# Patient Record
Sex: FEMALE | Race: WHITE | NOT HISPANIC OR LATINO | Employment: FULL TIME
[De-identification: names, ages, dates, MRNs, and addresses within clinical notes are randomized per-mention and may not be internally consistent; named-entity substitution may affect disease eponyms.]

---

## 2017-07-01 ENCOUNTER — HEALTH MAINTENANCE LETTER (OUTPATIENT)
Age: 32
End: 2017-07-01

## 2018-07-07 ENCOUNTER — HEALTH MAINTENANCE LETTER (OUTPATIENT)
Age: 33
End: 2018-07-07

## 2019-10-03 ENCOUNTER — HEALTH MAINTENANCE LETTER (OUTPATIENT)
Age: 34
End: 2019-10-03

## 2019-11-11 ENCOUNTER — TRANSFERRED RECORDS (OUTPATIENT)
Dept: PHYSICAL THERAPY | Facility: CLINIC | Age: 34
End: 2019-11-11

## 2020-04-16 LAB
ABORH_EXT (HISTORICAL CONVERSION): NORMAL
ANTIBODY_EXT (HISTORICAL CONVERSION): NORMAL
HBSAG_EXT (HISTORICAL CONVERSION): NORMAL
HGB_EXT (HISTORICAL CONVERSION): 12.7
HIV_EXT: NORMAL
PLT_EXT - HISTORICAL: 203
RPR - HISTORICAL: NORMAL
RUBELLA_EXT (HISTORICAL CONVERSION): NORMAL

## 2020-10-13 LAB — GBS_EXT (HISTORICAL CONVERSION): NEGATIVE

## 2020-10-16 ENCOUNTER — RECORDS - HEALTHEAST (OUTPATIENT)
Dept: ADMINISTRATIVE | Facility: OTHER | Age: 35
End: 2020-10-16

## 2020-10-29 ENCOUNTER — RECORDS - HEALTHEAST (OUTPATIENT)
Dept: ADMINISTRATIVE | Facility: OTHER | Age: 35
End: 2020-10-29

## 2020-10-30 ENCOUNTER — HOSPITAL ENCOUNTER (OUTPATIENT)
Dept: OBGYN | Facility: HOSPITAL | Age: 35
Discharge: HOME | End: 2020-10-30
Attending: OBSTETRICS & GYNECOLOGY | Admitting: OBSTETRICS & GYNECOLOGY

## 2020-10-30 ENCOUNTER — RECORDS - HEALTHEAST (OUTPATIENT)
Dept: ADMINISTRATIVE | Facility: OTHER | Age: 35
End: 2020-10-30

## 2020-10-30 ASSESSMENT — MIFFLIN-ST. JEOR: SCORE: 1201.18

## 2020-11-10 ENCOUNTER — HOSPITAL ENCOUNTER (OUTPATIENT)
Dept: OBGYN | Facility: HOSPITAL | Age: 35
Discharge: HOME | End: 2020-11-10
Attending: OBSTETRICS & GYNECOLOGY | Admitting: OBSTETRICS & GYNECOLOGY

## 2020-11-10 LAB
SARS-COV-2 PCR COMMENT: NORMAL
SARS-COV-2 RNA SPEC QL NAA+PROBE: NEGATIVE
SARS-COV-2 VIRUS SPECIMEN SOURCE: NORMAL

## 2020-11-11 ENCOUNTER — COMMUNICATION - HEALTHEAST (OUTPATIENT)
Dept: SCHEDULING | Facility: CLINIC | Age: 35
End: 2020-11-11

## 2020-11-12 ENCOUNTER — ANESTHESIA - HEALTHEAST (OUTPATIENT)
Dept: OBGYN | Facility: HOSPITAL | Age: 35
End: 2020-11-12

## 2020-11-13 ENCOUNTER — HOME CARE/HOSPICE - HEALTHEAST (OUTPATIENT)
Dept: HOME HEALTH SERVICES | Facility: HOME HEALTH | Age: 35
End: 2020-11-13

## 2021-01-15 ENCOUNTER — HEALTH MAINTENANCE LETTER (OUTPATIENT)
Age: 36
End: 2021-01-15

## 2021-06-05 VITALS — BODY MASS INDEX: 25.52 KG/M2 | HEIGHT: 60 IN | WEIGHT: 130 LBS

## 2021-06-12 NOTE — PROGRESS NOTES
Dr. Anderson called and notified of US results - SDP 2.5 cm and SOLO 5.7, biophysical profile 8/8.  Category 1 FH tracing.  Per Dr. Newell discharge patient home and have her follow-up Monday in clinic.

## 2021-06-12 NOTE — PROGRESS NOTES
Pt arrives to L&D from clinic for BPP with amniotic fluid volume and NST, pt oriented to room 6, EFM applied and triage navigator completed.

## 2021-06-12 NOTE — PROGRESS NOTES
Pt notified Dr. Newell aware of US results and pt okay to discharge home.  Reviewed discharge AVS with pt.  Pt verbalizes understanding to keep follow-up appointment on Monday with Dr. Newell.  Pt denies further questions and discharged home.

## 2021-06-13 NOTE — ANESTHESIA PROCEDURE NOTES
Epidural Block    Patient location during procedure: OB  Time Called: 11/12/2020 12:15 AM  Reason for Block:labor epidural  Staffing:  Performing  Anesthesiologist: Gladys Coon MD  Preanesthetic Checklist  Completed: patient identified, risks, benefits, and alternatives discussed, timeout performed, consent obtained, airway assessed, oxygen available, suction available, emergency drugs available and hand hygiene performed  Procedure  Patient position: sitting  Prep: ChloraPrep  Patient monitoring: continuous pulse oximetry, heart rate and blood pressure  Approach: midline  Location: L3-L4  Injection technique: SOBEIDA saline  Number of Attempts:1  Needle  Needle type: Pb   Needle gauge: 18 G     Catheter in Space: 5  Assessment  Sensory level:  No complications

## 2021-06-13 NOTE — ANESTHESIA PREPROCEDURE EVALUATION
Anesthesia Evaluation      Patient summary reviewed     Airway    Pulmonary - negative ROS                          Cardiovascular - negative ROS   Neuro/Psych    (+) depression,     Endo/Other    (+) hypothyroidism, pregnant     GI/Hepatic/Renal - negative ROS           Dental                         Anesthesia Plan  Planned anesthetic: epidural    ASA 2     Anesthetic plan and risks discussed with: patient and spouse    Post-op plan: routine recovery

## 2021-06-13 NOTE — ANESTHESIA POSTPROCEDURE EVALUATION
Patient: Yessi Kulkarni  * No procedures listed *  Anesthesia type: epidural    Patient location: PACU  Last vitals: No vitals data found for the desired time range.    Post vital signs: stable  Level of consciousness: awake and responds to simple questions  Post-anesthesia pain: pain controlled  Post-anesthesia nausea and vomiting: no  Pulmonary: unassisted, return to baseline  Cardiovascular: stable and blood pressure at baseline  Hydration: adequate  Anesthetic events: no    QCDR Measures:  ASA# 11 - Rody-op Cardiac Arrest: ASA11B - Patient did NOT experience unanticipated cardiac arrest  ASA# 12 - Rody-op Mortality Rate: ASA12B - Patient did NOT die  ASA# 13 - PACU Re-Intubation Rate: ASA13B - Patient did NOT require a new airway mgmt  ASA# 10 - Composite Anes Safety: ASA10A - No serious adverse event    Additional Notes:

## 2021-09-05 ENCOUNTER — HEALTH MAINTENANCE LETTER (OUTPATIENT)
Age: 36
End: 2021-09-05

## 2022-02-20 ENCOUNTER — HEALTH MAINTENANCE LETTER (OUTPATIENT)
Age: 37
End: 2022-02-20

## 2022-10-23 ENCOUNTER — HEALTH MAINTENANCE LETTER (OUTPATIENT)
Age: 37
End: 2022-10-23

## 2022-10-24 LAB
ABO (EXTERNAL): NORMAL
HEMATOCRIT (EXTERNAL): 42.5 % (ref 35–45)
HEMOGLOBIN (EXTERNAL): 14.2 G/DL (ref 11.7–15.5)
HEPATITIS B SURFACE ANTIGEN (EXTERNAL): NONREACTIVE
HEPATITIS C ANTIBODY (EXTERNAL): NEGATIVE
HIV1+2 AB SERPL QL IA: NEGATIVE
RH (EXTERNAL): NEGATIVE
RUBELLA ANTIBODY IGG (EXTERNAL): NORMAL
VDRL (SYPHILIS) (EXTERNAL): NONREACTIVE

## 2022-11-14 ENCOUNTER — TRANSFERRED RECORDS (OUTPATIENT)
Dept: HEALTH INFORMATION MANAGEMENT | Facility: CLINIC | Age: 37
End: 2022-11-14

## 2023-04-02 ENCOUNTER — HEALTH MAINTENANCE LETTER (OUTPATIENT)
Age: 38
End: 2023-04-02

## 2023-05-15 LAB — GROUP B STREPTOCOCCUS (EXTERNAL): NEGATIVE

## 2023-06-08 ENCOUNTER — ANESTHESIA (OUTPATIENT)
Dept: OBGYN | Facility: HOSPITAL | Age: 38
End: 2023-06-08
Payer: COMMERCIAL

## 2023-06-08 ENCOUNTER — ANESTHESIA EVENT (OUTPATIENT)
Dept: OBGYN | Facility: HOSPITAL | Age: 38
End: 2023-06-08
Payer: COMMERCIAL

## 2023-06-08 ENCOUNTER — HOSPITAL ENCOUNTER (INPATIENT)
Facility: HOSPITAL | Age: 38
LOS: 2 days | Discharge: HOME | End: 2023-06-10
Attending: INTERNAL MEDICINE | Admitting: OBSTETRICS & GYNECOLOGY
Payer: COMMERCIAL

## 2023-06-08 PROBLEM — Z37.9 NORMAL LABOR: Status: ACTIVE | Noted: 2023-06-08

## 2023-06-08 PROBLEM — Z36.89 ENCOUNTER FOR TRIAGE IN PREGNANT PATIENT: Status: ACTIVE | Noted: 2023-06-08

## 2023-06-08 LAB
ABO/RH(D): NORMAL
ANTIBODY SCREEN, TUBE: NORMAL
HOLD SPECIMEN: NORMAL
SPECIMEN EXPIRATION DATE: NORMAL
SPECIMEN EXPIRATION DATE: NORMAL

## 2023-06-08 PROCEDURE — 00HU33Z INSERTION OF INFUSION DEVICE INTO SPINAL CANAL, PERCUTANEOUS APPROACH: ICD-10-PCS | Performed by: ANESTHESIOLOGY

## 2023-06-08 PROCEDURE — 86850 RBC ANTIBODY SCREEN: CPT | Performed by: OBSTETRICS & GYNECOLOGY

## 2023-06-08 PROCEDURE — 258N000003 HC RX IP 258 OP 636: Performed by: OBSTETRICS & GYNECOLOGY

## 2023-06-08 PROCEDURE — 86901 BLOOD TYPING SEROLOGIC RH(D): CPT | Performed by: OBSTETRICS & GYNECOLOGY

## 2023-06-08 PROCEDURE — 36415 COLL VENOUS BLD VENIPUNCTURE: CPT | Performed by: OBSTETRICS & GYNECOLOGY

## 2023-06-08 PROCEDURE — 120N000001 HC R&B MED SURG/OB

## 2023-06-08 PROCEDURE — 3E0R3BZ INTRODUCTION OF ANESTHETIC AGENT INTO SPINAL CANAL, PERCUTANEOUS APPROACH: ICD-10-PCS | Performed by: ANESTHESIOLOGY

## 2023-06-08 PROCEDURE — 370N000003 HC ANESTHESIA WARD SERVICE: Performed by: ANESTHESIOLOGY

## 2023-06-08 PROCEDURE — 86780 TREPONEMA PALLIDUM: CPT | Performed by: OBSTETRICS & GYNECOLOGY

## 2023-06-08 RX ORDER — METOCLOPRAMIDE 10 MG/1
10 TABLET ORAL EVERY 6 HOURS PRN
Status: DISCONTINUED | OUTPATIENT
Start: 2023-06-08 | End: 2023-06-09 | Stop reason: HOSPADM

## 2023-06-08 RX ORDER — FENTANYL CITRATE-0.9 % NACL/PF 10 MCG/ML
100 PLASTIC BAG, INJECTION (ML) INTRAVENOUS EVERY 5 MIN PRN
Status: DISCONTINUED | OUTPATIENT
Start: 2023-06-08 | End: 2023-06-09 | Stop reason: HOSPADM

## 2023-06-08 RX ORDER — NALOXONE HYDROCHLORIDE 0.4 MG/ML
0.4 INJECTION, SOLUTION INTRAMUSCULAR; INTRAVENOUS; SUBCUTANEOUS
Status: DISCONTINUED | OUTPATIENT
Start: 2023-06-08 | End: 2023-06-09 | Stop reason: HOSPADM

## 2023-06-08 RX ORDER — KETOROLAC TROMETHAMINE 30 MG/ML
30 INJECTION, SOLUTION INTRAMUSCULAR; INTRAVENOUS
Status: DISCONTINUED | OUTPATIENT
Start: 2023-06-08 | End: 2023-06-10 | Stop reason: HOSPADM

## 2023-06-08 RX ORDER — CITRIC ACID/SODIUM CITRATE 334-500MG
30 SOLUTION, ORAL ORAL
Status: DISCONTINUED | OUTPATIENT
Start: 2023-06-08 | End: 2023-06-09 | Stop reason: HOSPADM

## 2023-06-08 RX ORDER — MISOPROSTOL 200 UG/1
400 TABLET ORAL
Status: DISCONTINUED | OUTPATIENT
Start: 2023-06-08 | End: 2023-06-09 | Stop reason: HOSPADM

## 2023-06-08 RX ORDER — LEVOTHYROXINE SODIUM 50 UG/1
50 TABLET ORAL DAILY
COMMUNITY

## 2023-06-08 RX ORDER — IBUPROFEN 800 MG/1
800 TABLET, FILM COATED ORAL
Status: DISCONTINUED | OUTPATIENT
Start: 2023-06-08 | End: 2023-06-10 | Stop reason: HOSPADM

## 2023-06-08 RX ORDER — ONDANSETRON 2 MG/ML
4 INJECTION INTRAMUSCULAR; INTRAVENOUS EVERY 6 HOURS PRN
Status: DISCONTINUED | OUTPATIENT
Start: 2023-06-08 | End: 2023-06-09 | Stop reason: HOSPADM

## 2023-06-08 RX ORDER — LIDOCAINE 40 MG/G
CREAM TOPICAL
Status: DISCONTINUED | OUTPATIENT
Start: 2023-06-08 | End: 2023-06-08 | Stop reason: HOSPADM

## 2023-06-08 RX ORDER — FENTANYL CITRATE 50 UG/ML
50 INJECTION, SOLUTION INTRAMUSCULAR; INTRAVENOUS EVERY 30 MIN PRN
Status: DISCONTINUED | OUTPATIENT
Start: 2023-06-08 | End: 2023-06-09 | Stop reason: HOSPADM

## 2023-06-08 RX ORDER — CARBOPROST TROMETHAMINE 250 UG/ML
250 INJECTION, SOLUTION INTRAMUSCULAR
Status: DISCONTINUED | OUTPATIENT
Start: 2023-06-08 | End: 2023-06-09 | Stop reason: HOSPADM

## 2023-06-08 RX ORDER — OXYTOCIN/0.9 % SODIUM CHLORIDE 30/500 ML
340 PLASTIC BAG, INJECTION (ML) INTRAVENOUS CONTINUOUS PRN
Status: DISCONTINUED | OUTPATIENT
Start: 2023-06-08 | End: 2023-06-09 | Stop reason: HOSPADM

## 2023-06-08 RX ORDER — FERROUS SULFATE 325(65) MG
325 TABLET ORAL
COMMUNITY

## 2023-06-08 RX ORDER — FENTANYL/ROPIVACAINE/NS/PF 2MCG/ML-.1
PLASTIC BAG, INJECTION (ML) EPIDURAL
Status: DISCONTINUED | OUTPATIENT
Start: 2023-06-09 | End: 2023-06-09 | Stop reason: HOSPADM

## 2023-06-08 RX ORDER — METOCLOPRAMIDE HYDROCHLORIDE 5 MG/ML
10 INJECTION INTRAMUSCULAR; INTRAVENOUS EVERY 6 HOURS PRN
Status: DISCONTINUED | OUTPATIENT
Start: 2023-06-08 | End: 2023-06-09 | Stop reason: HOSPADM

## 2023-06-08 RX ORDER — PROCHLORPERAZINE 25 MG
25 SUPPOSITORY, RECTAL RECTAL EVERY 12 HOURS PRN
Status: DISCONTINUED | OUTPATIENT
Start: 2023-06-08 | End: 2023-06-09 | Stop reason: HOSPADM

## 2023-06-08 RX ORDER — PROCHLORPERAZINE MALEATE 10 MG
10 TABLET ORAL EVERY 6 HOURS PRN
Status: DISCONTINUED | OUTPATIENT
Start: 2023-06-08 | End: 2023-06-09 | Stop reason: HOSPADM

## 2023-06-08 RX ORDER — OXYTOCIN/0.9 % SODIUM CHLORIDE 30/500 ML
100-340 PLASTIC BAG, INJECTION (ML) INTRAVENOUS CONTINUOUS PRN
Status: DISCONTINUED | OUTPATIENT
Start: 2023-06-08 | End: 2023-06-10 | Stop reason: HOSPADM

## 2023-06-08 RX ORDER — ONDANSETRON 4 MG/1
4 TABLET, ORALLY DISINTEGRATING ORAL EVERY 6 HOURS PRN
Status: DISCONTINUED | OUTPATIENT
Start: 2023-06-08 | End: 2023-06-09 | Stop reason: HOSPADM

## 2023-06-08 RX ORDER — NALOXONE HYDROCHLORIDE 0.4 MG/ML
0.2 INJECTION, SOLUTION INTRAMUSCULAR; INTRAVENOUS; SUBCUTANEOUS
Status: DISCONTINUED | OUTPATIENT
Start: 2023-06-08 | End: 2023-06-09 | Stop reason: HOSPADM

## 2023-06-08 RX ORDER — MISOPROSTOL 200 UG/1
800 TABLET ORAL
Status: DISCONTINUED | OUTPATIENT
Start: 2023-06-08 | End: 2023-06-09 | Stop reason: HOSPADM

## 2023-06-08 RX ORDER — OXYTOCIN 10 [USP'U]/ML
10 INJECTION, SOLUTION INTRAMUSCULAR; INTRAVENOUS
Status: DISCONTINUED | OUTPATIENT
Start: 2023-06-08 | End: 2023-06-09 | Stop reason: HOSPADM

## 2023-06-08 RX ORDER — OXYTOCIN 10 [USP'U]/ML
10 INJECTION, SOLUTION INTRAMUSCULAR; INTRAVENOUS
Status: DISCONTINUED | OUTPATIENT
Start: 2023-06-08 | End: 2023-06-10 | Stop reason: HOSPADM

## 2023-06-08 RX ORDER — PRENATAL VIT/IRON FUM/FOLIC AC 27MG-0.8MG
1 TABLET ORAL DAILY
COMMUNITY

## 2023-06-08 RX ORDER — NALBUPHINE HYDROCHLORIDE 20 MG/ML
2.5-5 INJECTION, SOLUTION INTRAMUSCULAR; INTRAVENOUS; SUBCUTANEOUS EVERY 6 HOURS PRN
Status: DISCONTINUED | OUTPATIENT
Start: 2023-06-08 | End: 2023-06-10 | Stop reason: HOSPADM

## 2023-06-08 RX ORDER — METHYLERGONOVINE MALEATE 0.2 MG/ML
200 INJECTION INTRAVENOUS
Status: DISCONTINUED | OUTPATIENT
Start: 2023-06-08 | End: 2023-06-09 | Stop reason: HOSPADM

## 2023-06-08 RX ADMIN — SODIUM CHLORIDE, POTASSIUM CHLORIDE, SODIUM LACTATE AND CALCIUM CHLORIDE 1000 ML: 600; 310; 30; 20 INJECTION, SOLUTION INTRAVENOUS at 23:08

## 2023-06-08 ASSESSMENT — ACTIVITIES OF DAILY LIVING (ADL)
CHANGE_IN_FUNCTIONAL_STATUS_SINCE_ONSET_OF_CURRENT_ILLNESS/INJURY: NO
DIFFICULTY_EATING/SWALLOWING: NO
FALL_HISTORY_WITHIN_LAST_SIX_MONTHS: NO
WALKING_OR_CLIMBING_STAIRS_DIFFICULTY: NO
DOING_ERRANDS_INDEPENDENTLY_DIFFICULTY: NO
WEAR_GLASSES_OR_BLIND: NO
ADLS_ACUITY_SCORE: 18
DRESSING/BATHING_DIFFICULTY: NO
TOILETING_ISSUES: NO
ADLS_ACUITY_SCORE: 35
CONCENTRATING,_REMEMBERING_OR_MAKING_DECISIONS_DIFFICULTY: NO

## 2023-06-09 PROCEDURE — 250N000011 HC RX IP 250 OP 636: Performed by: ANESTHESIOLOGY

## 2023-06-09 PROCEDURE — 722N000001 HC LABOR CARE VAGINAL DELIVERY SINGLE

## 2023-06-09 PROCEDURE — 250N000009 HC RX 250: Performed by: OBSTETRICS & GYNECOLOGY

## 2023-06-09 PROCEDURE — 120N000001 HC R&B MED SURG/OB

## 2023-06-09 PROCEDURE — 258N000003 HC RX IP 258 OP 636: Performed by: OBSTETRICS & GYNECOLOGY

## 2023-06-09 PROCEDURE — 0DQR0ZZ REPAIR ANAL SPHINCTER, OPEN APPROACH: ICD-10-PCS | Performed by: FAMILY MEDICINE

## 2023-06-09 PROCEDURE — 250N000011 HC RX IP 250 OP 636: Performed by: FAMILY MEDICINE

## 2023-06-09 PROCEDURE — 250N000013 HC RX MED GY IP 250 OP 250 PS 637: Performed by: FAMILY MEDICINE

## 2023-06-09 RX ORDER — MODIFIED LANOLIN
OINTMENT (GRAM) TOPICAL
Status: DISCONTINUED | OUTPATIENT
Start: 2023-06-09 | End: 2023-06-10 | Stop reason: HOSPADM

## 2023-06-09 RX ORDER — OXYTOCIN/0.9 % SODIUM CHLORIDE 30/500 ML
340 PLASTIC BAG, INJECTION (ML) INTRAVENOUS CONTINUOUS PRN
Status: DISCONTINUED | OUTPATIENT
Start: 2023-06-09 | End: 2023-06-10 | Stop reason: HOSPADM

## 2023-06-09 RX ORDER — HYDROCORTISONE 25 MG/G
CREAM TOPICAL 3 TIMES DAILY PRN
Status: DISCONTINUED | OUTPATIENT
Start: 2023-06-09 | End: 2023-06-10 | Stop reason: HOSPADM

## 2023-06-09 RX ORDER — SODIUM CHLORIDE, SODIUM LACTATE, POTASSIUM CHLORIDE, CALCIUM CHLORIDE 600; 310; 30; 20 MG/100ML; MG/100ML; MG/100ML; MG/100ML
INJECTION, SOLUTION INTRAVENOUS CONTINUOUS
Status: DISCONTINUED | OUTPATIENT
Start: 2023-06-09 | End: 2023-06-10 | Stop reason: HOSPADM

## 2023-06-09 RX ORDER — DOCUSATE SODIUM 100 MG/1
100 CAPSULE, LIQUID FILLED ORAL DAILY
Status: DISCONTINUED | OUTPATIENT
Start: 2023-06-09 | End: 2023-06-10 | Stop reason: HOSPADM

## 2023-06-09 RX ORDER — BUPIVACAINE HYDROCHLORIDE 2.5 MG/ML
INJECTION, SOLUTION EPIDURAL; INFILTRATION; INTRACAUDAL
Status: COMPLETED | OUTPATIENT
Start: 2023-06-09 | End: 2023-06-09

## 2023-06-09 RX ORDER — MISOPROSTOL 200 UG/1
400 TABLET ORAL
Status: DISCONTINUED | OUTPATIENT
Start: 2023-06-09 | End: 2023-06-10 | Stop reason: HOSPADM

## 2023-06-09 RX ORDER — IBUPROFEN 800 MG/1
800 TABLET, FILM COATED ORAL EVERY 6 HOURS PRN
Status: DISCONTINUED | OUTPATIENT
Start: 2023-06-09 | End: 2023-06-10 | Stop reason: HOSPADM

## 2023-06-09 RX ORDER — ACETAMINOPHEN 325 MG/1
650 TABLET ORAL EVERY 4 HOURS PRN
Status: DISCONTINUED | OUTPATIENT
Start: 2023-06-09 | End: 2023-06-10 | Stop reason: HOSPADM

## 2023-06-09 RX ORDER — BISACODYL 10 MG
10 SUPPOSITORY, RECTAL RECTAL DAILY PRN
Status: DISCONTINUED | OUTPATIENT
Start: 2023-06-09 | End: 2023-06-10 | Stop reason: HOSPADM

## 2023-06-09 RX ORDER — MISOPROSTOL 200 UG/1
800 TABLET ORAL
Status: DISCONTINUED | OUTPATIENT
Start: 2023-06-09 | End: 2023-06-10 | Stop reason: HOSPADM

## 2023-06-09 RX ORDER — OXYTOCIN 10 [USP'U]/ML
10 INJECTION, SOLUTION INTRAMUSCULAR; INTRAVENOUS
Status: DISCONTINUED | OUTPATIENT
Start: 2023-06-09 | End: 2023-06-10 | Stop reason: HOSPADM

## 2023-06-09 RX ORDER — CARBOPROST TROMETHAMINE 250 UG/ML
250 INJECTION, SOLUTION INTRAMUSCULAR
Status: DISCONTINUED | OUTPATIENT
Start: 2023-06-09 | End: 2023-06-10 | Stop reason: HOSPADM

## 2023-06-09 RX ORDER — METHYLERGONOVINE MALEATE 0.2 MG/ML
200 INJECTION INTRAVENOUS
Status: DISCONTINUED | OUTPATIENT
Start: 2023-06-09 | End: 2023-06-10 | Stop reason: HOSPADM

## 2023-06-09 RX ADMIN — IBUPROFEN 800 MG: 800 TABLET ORAL at 17:32

## 2023-06-09 RX ADMIN — Medication: at 21:50

## 2023-06-09 RX ADMIN — HUMAN RHO(D) IMMUNE GLOBULIN 300 MCG: 1500 SOLUTION INTRAMUSCULAR; INTRAVENOUS at 10:19

## 2023-06-09 RX ADMIN — SODIUM CHLORIDE, POTASSIUM CHLORIDE, SODIUM LACTATE AND CALCIUM CHLORIDE 125 ML/HR: 600; 310; 30; 20 INJECTION, SOLUTION INTRAVENOUS at 01:00

## 2023-06-09 RX ADMIN — BUPIVACAINE HYDROCHLORIDE 5 ML: 2.5 INJECTION, SOLUTION EPIDURAL; INFILTRATION; INTRACAUDAL at 00:15

## 2023-06-09 RX ADMIN — DOCUSATE SODIUM 100 MG: 100 CAPSULE, LIQUID FILLED ORAL at 10:18

## 2023-06-09 RX ADMIN — BENZOCAINE AND LEVOMENTHOL: 200; 5 SPRAY TOPICAL at 13:23

## 2023-06-09 RX ADMIN — ACETAMINOPHEN 650 MG: 325 TABLET ORAL at 21:51

## 2023-06-09 RX ADMIN — WITCH HAZEL: 500 SOLUTION RECTAL; TOPICAL at 13:23

## 2023-06-09 RX ADMIN — Medication: at 00:20

## 2023-06-09 RX ADMIN — LIDOCAINE HYDROCHLORIDE 20 ML: 10 INJECTION, SOLUTION EPIDURAL; INFILTRATION; INTRACAUDAL; PERINEURAL at 06:45

## 2023-06-09 ASSESSMENT — ACTIVITIES OF DAILY LIVING (ADL)
ADLS_ACUITY_SCORE: 18
ADLS_ACUITY_SCORE: 18
ADLS_ACUITY_SCORE: 21
ADLS_ACUITY_SCORE: 18

## 2023-06-09 NOTE — PROGRESS NOTES
Reactive NST. fhts 140 with moderate variaiblty, 15x15/prolonged accels present, no decels.   cxns 3-7 min apart. Mild in palpation thus far.   elevated bed with pt in it. Discussed safety and protocol of bed in lowest position when pt in it and staff not bedside. Cautioned on falling.   Birthing ball brought in to room. Pt enc to use tub as well. Discussed frequency for Intermittent Monitoring.   Also requested pt left RN know when she was willing to have admission questions asked. Pt verbalized understanding and agreement.Aaliyah Holt, RN

## 2023-06-09 NOTE — LACTATION NOTE
"This note was copied from a baby's chart.  This writer met with Yessi per lactation order.    Low milk supply with her first child who had torticollis, tongue tie and lip tie.  Yessi brought that infant to cranial sacral therapy and also had the lingual frenulum released.  She reports breastfeeding her first child for 30 minutes, then would bottle feed infant and then pump for one hour.  She states trying every flange size there is and found the 13 mm flange was the best fit (even though it took 60 minutes to drain her breasts).  She saw several lactation consultants and found that infant could only transfer 15 ml at the breast, then she could pump 2 oz with the pump (over an hour pumping session).  She states she has discussed with her  that she does not want to feed this  the same way she fed her first infant.      This writer assessed Yessi's nipples and recomends she uses the 21 mm flange, which this writer provided from the lactation office.  Educated that \"empty breasts make milk\".  If infant unable to drain the breast, then the pump needs to, and the breasts should be drained within 15-20 minutes.  If it takes longer to drain, then the flange may be the wrong size or the suction is too high.  Education given on the maintain program of breast pumps.  No milk flowing=Pump on fast cycle.  Milk is flowing=Pump on slow cycle.  Change the cycle (speed of the suck) according to milk flow and pump until breasts are drained, which is typically 15-20 minutes.  Suction is increased for comfort.  No pain with pumping.      Yessi has moderate nipple discomfort.  She states infant unable to obtain a deep latch.  Suck assessment done.  Infant unable to open his mouth and only the tip of this writer's gloved finger can enter infant's mouth.  Gentle jaw massage done for 1-2 minutes with slight improvement.  Hyper gag reflex noted.  Instructed Yessi to do gentle jaw massage for 1-2 minutes prior to breastfeeding, then " "allow infant to suck on finger.  Hand express breast to get colostrum flowing.  (Yessi was educated on hand expression and obtained a few drops, which were finger fed to infant).  Yessi request this writer educate her on latching infant at the breast.  Educated on properly positioning infant in cross cradle hold and shaping her breast tissue to match infant's mouth.  Asymmetrical latch technique taught and latch visual discussed.  She was able to properly hold infant at the breast and had good technique latching infant, however, she immediately complained of \"pinching\" on her nipples and needed to break infant's suction.  20 mm nipple shield given and educated on use and cleaning.  Able to latch infant onto the breast, however, Yessi had too much nipple pain and released suction.      Breastfeeding plan:    Yessi desires to pump and bottle feed only, at this time.  She desires donor milk.  Bedside RN updated.  She pumped her breasts with the 21 mm flange.  We discussed the care plan below.  IF infant does latch, she is to have comfort and listen for infant's swallows.  If infant has a supplement, then Yessi to pump.  Yessi is eager to learn.  Lactation to follow up tomorrow to assess breastfeeding plan.  Yessi verbalizes understanding of all education given.  She denies any further questions.          Care Plan - Latch Difficulty       Place baby skin to skin on mom's chest up to an hour before feeding.     Attempt breastfeeding on infant's early hunger cues (hand in mouth, rooting).    Position baby in cross cradle or football hold, which may help achieve latch.     If latched, watch for rhythmic sucking and occasional swallows.    Limit latch attempts to 5-10 minutes.    If latch difficulty or few/no swallows noted:  o Hand express colostrum and offer via spoon before or after feeding attempt to increase baby's energy level.  o Pump breasts for 15 minutes to stimulate milk production.   o Feed expressed milk to baby " using the amounts below as a guideline, give more as baby cues.  If necessary, make up the difference with donor milk or formula as a bridge until milk supply increases:    0-24 hours     2-10 ml each feeding (may use spoon)  24-48 hours   5-15 ml each feeding  48-72 hours   15-30 ml each feeding  72-96 hours   30-60 ml each feeding     Contact Outpatient Lactation Clinic after discharge as needed. [phone]            12/2021    Care Plan for Nipple Shield Use    How to use:    Wash in warm, soapy water. May leave moist to help stick to the skin.    Invert the nipple shield   way inside out and place over nipple.     the wings and stretch the nipple shield, easing the shield right side out and onto the nipple.    The shield should fit comfortably without pinching.    Latch baby deeply. Baby's lips should reach the flat base with entire nipple in baby's mouth.    Watch for:    Rhythmic sucking and swallowing.    Content baby after feeding.    Adequate wet & dirty diapers (per feeding log).    If baby not meeting above goals, pump breasts for 15 minutes to stimulate milk supply.    Weaning from a Nipple Shield:    Some babies need the nipple shield for a few days or a few weeks.  Once feeding improves, remove the nipple shield after a few minutes of breastfeeding and try to latch baby without the nipple shield.      Follow up with Outpatient Lactation Clinic is recommended - [phone].    12/2021

## 2023-06-09 NOTE — ANESTHESIA POSTPROCEDURE EVALUATION
Patient: Yessi Kulkarni    Procedure: * No procedures listed *       Anesthesia Type:  Epidural    Note:  Disposition: Admission   Postop Pain Control: Uneventful            Sign Out: Well controlled pain   PONV: No   Neuro/Psych: Uneventful            Sign Out: Acceptable/Baseline neuro status   Airway/Respiratory: Uneventful            Sign Out: Acceptable/Baseline resp. status   CV/Hemodynamics: Uneventful            Sign Out: Acceptable CV status; No obvious hypovolemia; No obvious fluid overload   Other NRE: NONE   DID A NON-ROUTINE EVENT OCCUR? No    Event details/Postop Comments:  Patient denies headache, point back tenderness, and issues with urination/ambulation.              Last vitals:  Vitals:    06/09/23 0818 06/09/23 0823 06/09/23 0828   BP:   116/68   Pulse:      Resp:      Temp:      SpO2: 97% 97% 98%       Electronically Signed By: JOSE ARORA MD  June 9, 2023  11:50 AM
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
## 2023-06-09 NOTE — PLAN OF CARE
Problem: Labor  Goal: Stable Fetal Wellbeing  Outcome: Met   Goal Outcome Evaluation:       Delivery of stable .  Apgars 8/9  Problem: Labor  Goal: Absence of Infection Signs and Symptoms  Outcome: Met     Afebrile and stable vital signs throughout labor

## 2023-06-09 NOTE — PLAN OF CARE
Postpartum assessment WNL. Denies significant pain. Declines pain medication.     Up independently, voiding without difficulty.    Perineum slightly uncomfortable, sitz bath taken. Witch hazel pads, derm spray and ice pack pad in use.     Breastfeeding, reports slight pain of nipples. Lactation consult placed. Offered nipple shields, patient declined. Educated on shallow vs deep latch.     PPMA and birth certificate need to be completed- at bedside. Patient desires to go home tomorrow. Attentive to and loving towards infant.

## 2023-06-09 NOTE — ANESTHESIA PROCEDURE NOTES
"Epidural catheter Procedure Note    Pre-Procedure   Staff -        Anesthesiologist:  Teresa Valdovinos MD       Performed By: anesthesiologist       Location: OB       Procedure Start/Stop Times: 6/9/2023 11:59 AM and 6/9/2023 12:21 PM       Pre-Anesthestic Checklist: patient identified, IV checked, risks and benefits discussed, informed consent, monitors and equipment checked, pre-op evaluation, at physician/surgeon's request and post-op pain management  Timeout:       Correct Patient: Yes        Correct Procedure: Yes        Correct Site: Yes        Correct Position: Yes   Procedure Documentation  Procedure: epidural catheter       Patient Position: sitting       Patient Prep/Sterile Barriers: sterile gloves, mask, patient draped       Skin prep: Chloraprep       Local skin infiltrated with mL of 2% lidocaine.        Insertion Site: L3-4. (midline approach).       Technique: LORT saline        SOBEIDA at 4 cm.       Needle Type: ToListen Upy needle       Needle Gauge: 17.        Needle Length (Inches): 3.5        Catheter: 18 G.          Catheter threaded easily.         5 cm epidural space.         Threaded 9 cm at skin.         # of attempts: 1 and  # of redirects:  0    Assessment/Narrative         Paresthesias: No.       Test dose of 3 mL lidocaine 1.5% w/ 1:200,000 epinephrine at 00:10 CDT.         Test dose negative, 3 minutes after injection, for signs of intravascular, subdural, or intrathecal injection.       Insertion/Infusion Method: LORT saline       No aspiration negative for Heme or CSF via Epidural Catheter.       Sensory Level Left: T10.       Sensory Level Right: T10.    Medication(s) Administered   0.25% Bupivacaine PF (Epidural) - EPIDURAL   5 mL - 6/9/2023 12:15:00 AM  Medication Administration Time: 6/9/2023 11:59 AM      FOR [facility] ([facility] ONLY:   Pain Team Contact information: please page the Pain Team Via [employer]. Search \"Pain\". During daytime hours, please page the attending first. At " night please page the resident first.
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
## 2023-06-09 NOTE — PROGRESS NOTES
Pt called RN into room at 2238 to state she was feeling more pressure. Pt standing with , leaning into him. Offered sve and pt declined. Did IA with pt standing bedside. RN remained bedside with pt, waiting for direction on her needs. Pt still using hypnobirthing via head band, leaning into  and  giving counter pressure. Pt stated she feels baby coming down. Discussed sve again and this time in agreement. assisted pt to lying position to the best she was able for sve. Cervix was 4/80/-2. BOW intact. Bloody show present.   Pt stated she wanted the epidural. #20 gauge IV to left hand placed on 2nd attempt. Blood return noted. Saline flushed. LR adminsiitered at 999ml/hr in prep for the epidural.  Report to LINO Resendiz and care relinquished.Aaliyah Holt RN

## 2023-06-09 NOTE — PROGRESS NOTES
39 y/o at 39.4 weeks gest with edc 23 presents to [facility] via w/c for labor evaluation. Pt here with  and . States cxns started at 1600. +FM. Denies LOF and VB. States cxns are really close and feels them everywhere. Denies complications with pregnancy. Reports GBS neg.  Pt using Hypnobirthing. Remains in own clothes. Declines sve. 2 cxns palpated and mild. Uterus soft between cxns. Pt states baby is vertex as of US on Monday. Only sve was at 36 weeks and was 2cm.   requesting tub upon arrival. Informed of need for monitoring upon arrival to hospital for initial assessment and will then utilize comfort measures that appeal to pt.   Monitors applied and VS taken in semi fowlers position. Offered position change for comfort, pt declined.   Notified Dr. Ireland (covering for [place]) of pt arrival and limited information. No prenatal available. MD requested to monitor pt for labor and check cervix. Obtained orders to remove monitors after Reactive NST/negative OCT.   Pt accepting of position change when RN reviewed tracing and no accels present. Discussed having pt off her back for baby. Positioned to left lateral, pillows between knees and warm back to lower back.Aaliyah Holt RN

## 2023-06-09 NOTE — L&D DELIVERY NOTE
OB Vaginal Delivery Note    Yessi Kulkarni MRN# [id number]   Age: 38 year old YOB: 1985       GA: 39w5d  GP:   Labor Complications: None   EBL:   mL  Delivery QBL: 425 mL  Delivery Type: Vaginal, Spontaneous   ROM to Delivery Time: (Delivered) Hours: 5 Minutes: 25   Weight: 2.977 kg (6 lb 9 oz)    1 Minute 5 Minute 10 Minute   Apgar Totals: 8   9        TALON SUAZO;CL MEAD     Delivery Details:  Yessi Kulkarni, a 38 year old  female delivered a viable infant with apgars of 8  and 9 . Patient was fully dilated and pushing after   hours   minutes in active labor. Delivery was via vaginal, spontaneous  to a sterile field under epidural;local  anesthesia. Infant delivered in vertex  left  occiput  anterior  position. Anterior and posterior shoulders delivered without difficulty. The cord was clamped, cut twice and 3 vessels  were noted. Cord blood was obtained in routine fashion with the following disposition: lab .      Cord complications: nuchal   Placenta delivered at 2023  6:42 AM . Placental disposition was Hospital disposal . Fundal massage performed and fundus found to be firm.     Episiotomy: none    Perineum, vagina, cervix were inspected, and the following lacerations were noted:   Perineal lacerations: 2nd                Any lacerations were repaired in the usual fashion using 3-0 Vicryl.    Excellent hemostasis was noted. Needle count correct. Infant and patient in delivery room in good and stable condition.        Valentín Kulkarni-Yessi [[id number]]    Labor Event Times    Latent labor onset date/time: 2023 1600    Active labor onset date: 23 Onset time:  1:15 AM CDT   Dilation complete date: 23 Complete time:  3:15 AM   Start pushing date/time: 2023 0405      Labor Events     labor?: No   steroids: None  Labor Type: Spontaneous     Antibiotics received during labor?: No     Rupture identifier: Sac 1  Rupture date/time: 23  0055   Rupture type: Spontaneous Rupture of Membranes  Fluid color: Meconium     Augmentation: None     Delivery/Placenta Date and Time    Delivery Date: 23 Delivery Time:  6:20 AM   Placenta Date/Time: 2023  6:42 AM  Delivering clinician: Kathrin Das MD   Other personnel present at delivery:  Provider Role   Astrid Oreilly, RN Registered Nurse   Flavia Fabian RN Registered Nurse         Vaginal Counts     Initial count performed by 2 team members:  Two Team Members   Luzmaria Cheek RN       Needles Suture Needles Sponges (RETIRED) Instruments   Initial counts 0 0 5    Added to count 1 1     Relief counts       Final counts 1 1 5          Placed during labor Accounted for at the end of labor   FSE No NA   IUPC No NA   Cervidil No NA                     Apgars    Living status: Living   1 Minute 5 Minute 10 Minute 15 Minute 20 Minute   Skin color: 1  1       Heart rate: 2  2       Reflex irritability: 1  2       Muscle tone: 2  2       Respiratory effort: 2  2       Total: 8  9       Apgars assigned by: FRANKLIN VICTOR APRN,CNP     Cord    Vessels: 3 Vessels    Cord Complications: Nuchal   Nuchal Intervention: reduced   Nuchal cord description: loose nuchal cord   Cord Blood Disposition: Lab      Gases Sent?: No      Delayed cord clamping?: Yes      Cord Clamping Delay (seconds):  seconds      Stem cell collection?: No                      Resuscitation    Methods: Temp Skin Control, Temp Skin Probe   Care at Delivery: Asked by Dr. Alfonso to attend the delivery of this term, male infant  secondary to meconium fluid.      Infant had spontaneous respirations at birth. He received approximately 2 minutes of delayed cord clamping. He was placed on a warmer, dried, stimulated, and deep suctioned. Suctioned for moderate amount of thick green/brown fluid. Lung sounds coarse, skin pink, loud lusty cry, active tone. Coarse lung sounds improved with suction and time.    Apgars  "were 8 at one minute and 9 at five minutes of age. Gross PE is WNL except for head molding and improving lung coarsness. Infant remains in the care of the L and D staff. Call with concerns.     Lillian QUICK, CNP 2023 6:34 AM     Output in Delivery Room: Stool     [place] Measurements    Weight: 6 lb 9 oz Length: 1' 8\"   Head circumference: 35.5 cm    Output in delivery room: Stool     Labor Events and Shoulder Dystocia    Fetal Tracing Prior to Delivery: Category 2  Shoulder dystocia present?: Neg     Delivery (Maternal) (Provider to Complete) (660754)    Episiotomy: None  Perineal lacerations: 2nd Repaired?: Yes   Repair suture: 3-0 Vicryl  Number of repair packets: 1  Genital tract inspection done: Pos     Blood Loss  Mother: Yessi Kulkarni #[id number]   Start of Mother's Information    Delivery Blood Loss  23 0115 - 23 0727    Delivery QBL (mL) Hospital Encounter 425 mL    Total  425 mL         End of Mother's Information  Mother: Yessi Kulkarni #[id number]          Delivery - Provider to Complete ([id number])    Delivering clinician: Kathrin Das MD  Delivery Type (Choose the 1 that will go to the Birth History): Vaginal, Spontaneous    Other personnel:  Provider Role   Astrid Oreilly, RN Registered Nurse   Flavia Fabian, RN Registered Nurse                               Placenta    Date/Time: 2023  6:42 AM  Removal: Spontaneous  Comments: 2 loose loops of nuchal cord   Disposition: Hospital disposal           Anesthesia    Method: Epidural, Local                Presentation and Position    Presentation: Vertex    Position: Left Occiput Anterior                 Kathrin Das MD  "

## 2023-06-09 NOTE — PROGRESS NOTES
Dr. Ireland in room to see pt. Pt remains in shower. States cxns stronger and closer. No bloody show present. Will cont to support early labor.Aaliyah Holt RN

## 2023-06-09 NOTE — PROGRESS NOTES
While using Intermittent Auscultation, fetal heart tones will be monitored before, during and after cxn. If deceleration noted, will return to continuous monitoring..Aaliyah Holt RN

## 2023-06-10 VITALS
DIASTOLIC BLOOD PRESSURE: 76 MMHG | BODY MASS INDEX: 25.78 KG/M2 | HEIGHT: 60 IN | RESPIRATION RATE: 16 BRPM | HEART RATE: 98 BPM | SYSTOLIC BLOOD PRESSURE: 109 MMHG | OXYGEN SATURATION: 100 % | TEMPERATURE: 97.2 F

## 2023-06-10 LAB — T PALLIDUM AB SER QL: NONREACTIVE

## 2023-06-10 PROCEDURE — 250N000013 HC RX MED GY IP 250 OP 250 PS 637: Performed by: FAMILY MEDICINE

## 2023-06-10 RX ADMIN — DOCUSATE SODIUM 100 MG: 100 CAPSULE, LIQUID FILLED ORAL at 08:19

## 2023-06-10 RX ADMIN — IBUPROFEN 800 MG: 800 TABLET ORAL at 08:19

## 2023-06-10 RX ADMIN — ACETAMINOPHEN 650 MG: 325 TABLET ORAL at 08:20

## 2023-06-10 RX ADMIN — ACETAMINOPHEN 650 MG: 325 TABLET ORAL at 02:12

## 2023-06-10 RX ADMIN — IBUPROFEN 800 MG: 800 TABLET ORAL at 00:47

## 2023-06-10 ASSESSMENT — ACTIVITIES OF DAILY LIVING (ADL)
ADLS_ACUITY_SCORE: 18

## 2023-06-10 NOTE — PLAN OF CARE
Problem: Plan of Care - These are the overarching goals to be used throughout the patient stay.    Goal: Optimal Comfort and Wellbeing  Outcome: Progressing  Intervention: Monitor Pain and Promote Comfort  Recent Flowsheet Documentation  Taken 6/9/2023 2151 by Karen Duque RN  Pain Management Interventions: medication (see MAR)  Taken 6/9/2023 1732 by Karen Duque RN  Pain Management Interventions: medication (see MAR)  Intervention: Provide Person-Centered Care  Recent Flowsheet Documentation  Taken 6/9/2023 1732 by Karen Duque RN  Trust Relationship/Rapport:   care explained   choices provided   questions answered   questions encouraged   thoughts/feelings acknowledged   reassurance provided  Patient independent in room. Did not want to breastfeed due to nipple tenderness. Pumping encouragement and assistance provided. Gel pads and lanolin provided. Tylenol and Ibuprofen given per MAR. Fundus firm, bleeding scant. VSS.    /76 (BP Location: Right arm, Patient Position: Semi-Blevins's, Cuff Size: Adult Regular)   Pulse 88   Temp 97.7  F (36.5  C) (Oral)   Resp 16   Ht 1.524 m (5')   SpO2 97%   BMI 25.78 kg/m      Karen Duque RN on 6/9/2023 at 10:43 PM

## 2023-06-10 NOTE — PLAN OF CARE
Yessi's vitals and maternal assessments WDL. Pain adequately managed with Tylenol and Ibuprofen. Up independently caring for self and infant. Did not breastfeed overnight due to nipple tenderness. Pumping encouraged and assistance offered. Yessi and her spouse are attentive to infant needs, asking appropriate questions and appear to be bonding well.     Problem: Plan of Care - These are the overarching goals to be used throughout the patient stay.    Goal: Optimal Comfort and Wellbeing  Outcome: Progressing  Intervention: Monitor Pain and Promote Comfort  Recent Flowsheet Documentation  Taken 6/10/2023 0208 by Karina Newell, RN  Pain Management Interventions: medication (see MAR)  Intervention: Provide Person-Centered Care  Recent Flowsheet Documentation  Taken 6/10/2023 0208 by Karina Newell, RN  Trust Relationship/Rapport:    care explained    choices provided    emotional support provided    empathic listening provided    questions encouraged    questions answered    thoughts/feelings acknowledged    reassurance provided     Problem: Postpartum (Vaginal Delivery)  Goal: Successful Maternal Role Transition  Outcome: Progressing     Problem: Postpartum (Vaginal Delivery)  Goal: Optimal Pain Control and Function  Outcome: Progressing  Intervention: Prevent or Manage Pain  Recent Flowsheet Documentation  Taken 6/10/2023 0208 by Karina Newell, RN  Pain Management Interventions: medication (see MAR)  Perineal Care:    perineal spray bottle/warm water use encouraged    absorbent brief/pad changed   Goal Outcome Evaluation:      Plan of Care Reviewed With: patient    Overall Patient Progress: improvingOverall Patient Progress: improving

## 2023-06-10 NOTE — PROGRESS NOTES
06/10/23   Yessi Kulkarni   1985    Postpartum Rounding Note    Subjective: Patient doing well. She is tolerating general diet and ambulating without lightheadedness. Vaginal bleeding is within normal limits. She felt incomplete bladder emptying, but was able to empty to less than 300cc on repeat attempt.      Vital signs:  Temp: 97.2  F (36.2  C) Temp src: Oral BP: 109/76 Pulse: 98   Resp: 16 SpO2: 100 % O2 Device: None (Room air)        Physical Exam:  General:  no distress  Abdomen: nontender, uterine fundus firm below umbilicus  Extremities: no calf pain, no edema in legs    Labs: all recent labs reviewed    Assessment/Plan: 38 year old PPD#1 s/p   1. Postpartum    - afebrile, VSS   - continue postpartum cares   2. Rh Neg, baby Rh +   - received rhogam   3. Partial 3rd degree laceration   - bowel regimen discussed  4. Hypothyroidism   - continue PTA levothyroxine at discharge    Discussed plan of care with patient and , and patient expressed understanding. Opportunity for questions given, and all questions were answered.    Disposition: plan for discharge today if bladder scan after next void is <300cc, follow up in office in 2-3 weeks for laceration check and 6 weeks.      Cheli Boudreaux MD

## 2023-06-10 NOTE — DISCHARGE SUMMARY
06/10/23   Yessi JACKSON Shad   1985    Discharge summary    Admit date: 2023     Discharge date: 06/10/23       Admit diagnoses:  1. Labor   2. IUP at 39 weeks 4 days  3. Hypothyroidism  4. Rh Neg    Discharge diagnoses:  1. S/p   2. Partial 3rd degree laceration  3. Hypothyroidism  4. Rh Neg    Hospital course:   The patient was admitted at 39 weeks and 4 days for labor. The patient had a male infant by  at 39 weeks and 5 days complicated by partial 3rd degree laceration; see delivery note.  After , she was tolerating a normal diet and ambulating without lightheadedness, and her vaginal bleeding was within normal limits for postpartum. She felt incomplete bladder emptying, but was able to empty to less than 300cc on bladder scan on repeat attempt. She was discharged to home on postpartum day 1.    Rubella immune  Rh Neg, baby Rh +   - received rhogam     Disposition: Discharged to home with follow-up  in office in 2-3 weeks for laceration check and 6 weeks.      Cheli Boudreaux MD

## 2023-06-10 NOTE — DISCHARGE INSTRUCTIONS
Congratulations on the birth of your baby !    Please call if your bleeding is bright red more than a pad an hour and doesn't seem to be slowing down. Please call if your temperature goes above 100 degrees. If you have stitches, soaking in a warm, soapy bathtub once or twice a day the first week you are home will keep the area clean and help it heal.     You may use Tylenol 1000mg every 8 hours and/or Ibuprofen 800mg every 8 hours for pain.     Recommend taking prenatal vitamin, vitamin D 2000 units/day, and fish oil with DHA while you are breast-feeding. Assure you are eating adequate amounts of protein (about 75g per day if breastfeeding without supplementation during the first 6 months of your baby's life) as well as healthy fats (avocado, olive oil, avocado oil, coconut oil) while breast-feeding. Drink at least 3 quarts of water per day spread out throughout the day.       Call [phone] for questions during the day and for emergencies on nights and weekends.    Please make an appointment in 2 weeks for laceration check and six weeks for postpartum visit.     Postpartum Vaginal Delivery Instructions    Activity     Ask family and friends for help when you need it.  Do not place anything in your vagina for 6 weeks.  You are not restricted on other activities, but take it easy for a few weeks to allow your body to recover from delivery.  You are able to do any activities you feel up to that point.  No driving until you have stopped taking your pain medications (usually two weeks after delivery).     Call your health care provider if you have any of these symptoms:     Increased pain, swelling, redness, or fluid around your stiches from an episiotomy or perineal tear.  A fever above 100.4 F (38 C) with or without chills when placing a thermometer under your tongue.  You soak a sanitary pad with blood within 1 hour, or you see blood clots larger than a golf ball.  Bleeding that lasts more than 6 weeks.  Vaginal  discharge that smells bad.  Severe pain, cramping or tenderness in your lower belly area.  A need to urinate more frequently (use the toilet more often), more urgently (use the toilet very quickly), or it burns when you urinate.  Nausea and vomiting.  Redness, swelling or pain around a vein in your leg.  Problems breastfeeding or a red or painful area on your breast.  Chest pain and cough or are gasping for air.  Problems coping with sadness, anxiety, or depression.  If you have any concerns about hurting yourself or the baby, call your provider immediately.   You have questions or concerns after you return home.     Keep your hands clean:  Always wash your hands before touching your perineal area and stitches.  This helps reduce your risk of infection.  If your hands aren't dirty, you may use an alcohol hand-rub to clean your hands. Keep your nails clean and short.        Warning Signs after Having a Baby (Postpartum)  Keep this paper on your fridge or somewhere else where you can see it.  Baby's birth date ___________________ My provider/hospital ___________________________________  The symptoms below can happen to anyone after giving birth. They can be very serious. Call your provider if you have any of these warning signs.   Call 911 if you:  Are thinking about hurting yourself or your baby  Have any signs below in bold and with a star*  Bleeding  Losing too much blood is called a hemorrhage. These are signs of hemorrhage:  Soaking through a pad in less than an hour  Passing blood clots bigger than a golf ball  Mood problems (postpartum depression)  Many women feel sad after having a baby. But for others, mood swings are worse. Call your provider right away if you are:  Feeling so anxious or nervous that you can't care for yourself or your baby  Thinking about hurting yourself or your baby*  High blood pressure (pre-eclampsia)  Even if you didn't have high blood pressure when you were pregnant, you are still at  "risk for the high blood pressure disease called pre-eclampsia. Your risk can last up to 12 weeks after giving birth.  These are signs of pre-eclampsia:  Severe headache that does not get better after taking medicine*  Seeing spots, flashes of lights, blurry vision or other changes to eyesight*  Pain on your right side under your rib cage  Sudden swelling in the hands and face  Fainting, shaking or other signs of a seizure*  General feeling of \"not feeling right\"*  Infection  Redness around your incision (if you had surgery)  Really bad pain in your bottom if you had stitches  Any yellow, white or green fluid coming from places where you had stitches or surgery  Fever of 100.4 F (38 C) or higher  Blood clots  After you give birth, your body naturally clumps, or clots, its blood to help prevent blood loss (hemorrhage). Sometimes, this can lead to dangerous blood clots. Here are signs that you may have a blood clot:  Pain in your chest*  Hard to breathe*  A red or swollen spot on your leg that is warm or painful when you touch it  Remember:You know your body. If something doesn't feel right, call your provider.  For informational purposes only. Not to replace the advice of your health care provider. Copyright   2020 [employer]. All rights reserved. Clinically reviewed by MARCELLE Jones-OB, MSN. [employer] 586682 - 1/20.  For informational purposes only. Not to replace the advice of your health care provider. Copyright   2020 [employer]. All rights reserved. Clinically reviewed by RIK JonesOB, MSN. [employer] [id number] - 1/20.        "

## 2023-06-10 NOTE — LACTATION NOTE
Lactation to patient room to assess breastfeeding situation at this time. Mom with  history of low milk supply and history of hormonal infertility, pregnancy loss, and low thyroid. Mom states her nipples are very painful so she has been not been breastfeeding or pumping, only hand expression but has not seen colostrum.      Mom states OT has been to evaluate infant suck and bottle feeding ability best with rakan bottle at this time. Encouraged mom to pump on lowest setting comfortable 8x in 24 hours as possible to stimulate milk production during this hormonally sensitive time. Mother very tearful about past breastfeeding experience as well as this experience. Provided active listening, support, and encouragement.    Encouraged mom to follow up with outpatient lactation consultant early next week, as well as endocrinologist for low thyroid. Mom states understanding.

## 2023-06-10 NOTE — PLAN OF CARE
Problem: Plan of Care - These are the overarching goals to be used throughout the patient stay.    Goal: Plan of Care Review  Description: The Plan of Care Review/Shift note should be completed every shift.  The Outcome Evaluation is a brief statement about your assessment that the patient is improving, declining, or no change.  This information will be displayed automatically on your shift note.  Outcome: Met     Vitals stable. Fundus firm at midline. Patient up ad shauna. Patient utilizing donor milk for supplementation. Patient stated she is taking a break from breastfeeding due to tenderness of nipples. Lactation visited. Occupational therapy visited.       Patient expressed being able to void, but felt that she was unable to fully empty her bladder. Bladder scan was performed @ 1125, and had 379 mL. Patient voided shortly afterwards and her output was 150 mL. Second bladder scan was done @ 1150 with 261 mL. Encouraged patient to use the toilet q 2-3 hours. MD Boudreaux aware.     Birth certificate and mood assessment (score of 5) completed and collected. Offered  consult if needed, and patient declined.     Discharge instructions, follow-up appointments, medications, and warning signs reviewed. Patient and spouse asking appropriate questions.

## 2023-06-11 ENCOUNTER — MEDICAL CORRESPONDENCE (OUTPATIENT)
Dept: HEALTH INFORMATION MANAGEMENT | Facility: CLINIC | Age: 38
End: 2023-06-11
Payer: COMMERCIAL

## 2024-06-02 ENCOUNTER — HEALTH MAINTENANCE LETTER (OUTPATIENT)
Age: 39
End: 2024-06-02

## 2024-12-02 ENCOUNTER — TRANSFERRED RECORDS (OUTPATIENT)
Dept: HEALTH INFORMATION MANAGEMENT | Facility: CLINIC | Age: 39
End: 2024-12-02

## 2025-03-10 ENCOUNTER — TRANSFERRED RECORDS (OUTPATIENT)
Dept: HEALTH INFORMATION MANAGEMENT | Facility: CLINIC | Age: 40
End: 2025-03-10
Payer: COMMERCIAL

## 2025-03-11 ENCOUNTER — TRANSCRIBE ORDERS (OUTPATIENT)
Dept: OTHER | Age: 40
End: 2025-03-11

## 2025-03-11 ENCOUNTER — TRANSFERRED RECORDS (OUTPATIENT)
Dept: HEALTH INFORMATION MANAGEMENT | Facility: CLINIC | Age: 40
End: 2025-03-11
Payer: COMMERCIAL

## 2025-03-11 ENCOUNTER — MEDICAL CORRESPONDENCE (OUTPATIENT)
Dept: HEALTH INFORMATION MANAGEMENT | Facility: CLINIC | Age: 40
End: 2025-03-11
Payer: COMMERCIAL

## 2025-03-11 DIAGNOSIS — R76.8 SS-B ANTIBODY POSITIVE: ICD-10-CM

## 2025-03-11 DIAGNOSIS — Z86.2 HISTORY OF AUTOIMMUNE DISEASE: Primary | ICD-10-CM

## 2025-03-11 DIAGNOSIS — R76.8 SS-A ANTIBODY POSITIVE: ICD-10-CM

## 2025-03-11 DIAGNOSIS — Z88.0 PENICILLIN ALLERGY: Primary | ICD-10-CM

## 2025-03-23 ENCOUNTER — HEALTH MAINTENANCE LETTER (OUTPATIENT)
Age: 40
End: 2025-03-23

## 2025-05-19 ENCOUNTER — PATIENT OUTREACH (OUTPATIENT)
Dept: CARE COORDINATION | Facility: CLINIC | Age: 40
End: 2025-05-19
Payer: COMMERCIAL

## 2025-05-21 ENCOUNTER — PATIENT OUTREACH (OUTPATIENT)
Dept: CARE COORDINATION | Facility: CLINIC | Age: 40
End: 2025-05-21
Payer: COMMERCIAL

## 2025-06-15 ENCOUNTER — HEALTH MAINTENANCE LETTER (OUTPATIENT)
Age: 40
End: 2025-06-15